# Patient Record
Sex: MALE | Race: WHITE
[De-identification: names, ages, dates, MRNs, and addresses within clinical notes are randomized per-mention and may not be internally consistent; named-entity substitution may affect disease eponyms.]

---

## 2018-10-24 ENCOUNTER — HOSPITAL ENCOUNTER (EMERGENCY)
Dept: HOSPITAL 25 - UCCORT | Age: 28
Discharge: HOME | End: 2018-10-24
Payer: COMMERCIAL

## 2018-10-24 VITALS — DIASTOLIC BLOOD PRESSURE: 87 MMHG | SYSTOLIC BLOOD PRESSURE: 129 MMHG

## 2018-10-24 DIAGNOSIS — Z88.0: ICD-10-CM

## 2018-10-24 DIAGNOSIS — F17.210: ICD-10-CM

## 2018-10-24 DIAGNOSIS — F12.90: ICD-10-CM

## 2018-10-24 DIAGNOSIS — J32.9: Primary | ICD-10-CM

## 2018-10-24 PROCEDURE — 99212 OFFICE O/P EST SF 10 MIN: CPT

## 2018-10-24 PROCEDURE — G0463 HOSPITAL OUTPT CLINIC VISIT: HCPCS

## 2018-10-24 NOTE — UC
Throat Pain/Nasal Yevgeniy HPI





- HPI Summary


HPI Summary: 





28-year-old male comes to clinic today with a week and half history of sinus 

pressure and rhinorrhea.  More recently the rhinorrhea is been yellow with some 

blood.  Patient's been take some over-the-counter medications helps briefly but 

is getting worse overall.  No fevers.  Has had some chills.





- History of Current Complaint


Chief Complaint: UCRespiratory


Stated Complaint: SINUSES, HEADACHES


Time Seen by Provider: 10/24/18 11:12


Pain Intensity: 6





- Allergies/Home Medications


Allergies/Adverse Reactions: 


 Allergies











Allergy/AdvReac Type Severity Reaction Status Date / Time


 


MS Penicillins [Penicillins] Allergy Intermediate Hives Verified 10/24/18 10:58











Home Medications: 


 Home Medications





Acetaminophen TAB* [Tylenol TAB*] 650 mg PO Q4H PRN 10/24/18 [History Confirmed 

10/24/18]


Ibuprofen TAB* [Advil TAB*] 400 mg PO Q6H PRN 10/24/18 [History Confirmed 10/24/

18]


guaiFENesin ER TAB [Mucinex*] 600 mg PO BID PRN 10/24/18 [History Confirmed 10/

24/18]











PMH/Surg Hx/FS Hx/Imm Hx


Previously Healthy: Yes





- Surgical History


Surgical History: None


Surgery Procedure, Year, and Place: abcess removal x2





- Family History


Known Family History: 


   Negative: Diabetes





- Social History


Alcohol Use: Rare


Substance Use Type: Marijuana


Substance Use Comment - Amount & Last Used: 3/14


Smoking Status (MU): Light Every Day Tobacco Smoker


Type: Cigarettes


Amount Used/How Often: 4 CIGS A DAY


Have You Smoked in the Last Year: Yes





Review of Systems


Constitutional: Negative


Skin: Negative


Eyes: Negative


ENT: Sore Throat, Nasal Discharge, Sinus Congestion, Sinus Pain/Tenderness


Respiratory: Negative


Cardiovascular: Negative


Gastrointestinal: Negative


Motor: Negative


Neurovascular: Negative


Musculoskeletal: Negative


Neurological: Negative


Psychological: Negative


Is Patient Immunocompromised?: No


All Other Systems Reviewed And Are Negative: Yes





Physical Exam


Triage Information Reviewed: Yes


Appearance: Well-Nourished, Ill-Appearing - MILD, Pain Distress - MILD


Vital Signs: 


 Initial Vital Signs











Temp  98.1 F   10/24/18 11:01


 


Pulse  82   10/24/18 11:01


 


Resp  18   10/24/18 11:01


 


BP  129/87   10/24/18 11:01


 


Pulse Ox  99   10/24/18 11:01











Vital Signs Reviewed: Yes


Eye Exam: Normal


Eyes: Positive: Conjunctiva Clear


ENT: Positive: Pharyngeal erythema, Nasal congestion, Nasal drainage, TMs normal


Neck exam: Normal


Neck: Positive: Supple


Respiratory: Positive: Lungs clear, Normal breath sounds, No respiratory 

distress


Cardiovascular: Positive: RRR


Musculoskeletal Exam: Normal


Musculoskeletal: Positive: Strength Intact, ROM Intact


Neurological Exam: Normal


Neurological: Positive: Alert, Muscle Tone Normal


Psychological Exam: Normal


Psychological: Positive: Age Appropriate Behavior


Skin Exam: Normal





Throat Pain/Nasal Course/Dx





- Differential Dx/Diagnosis


Provider Diagnoses: SINUSITIS





Discharge





- Sign-Out/Discharge


Documenting (check all that apply): Patient Departure


All imaging exams completed and their final reports reviewed: No Studies





- Discharge Plan


Condition: Stable


Disposition: HOME


Prescriptions: 


Amoxicillin/Clavulanate TAB* [Augmentin *] 875 mg PO BID #20 tab


Patient Education Materials:  Sinusitis (ED)


Forms:  *Work Release


Referrals: 


JERRY Quezada PA [Primary Care Provider] - 


Additional Instructions: 


FOLLOW UP WITH YOUR DOCTOR IF NOT COMPLETELY IMPROVED.


GET RECHECKED FOR ANY WORSENING OF YOUR CONDITION OR QUESTIONS OR CONCERNS.





- Billing Disposition and Condition


Condition: STABLE


Disposition: Home